# Patient Record
Sex: FEMALE | Race: WHITE | NOT HISPANIC OR LATINO | Employment: FULL TIME | URBAN - METROPOLITAN AREA
[De-identification: names, ages, dates, MRNs, and addresses within clinical notes are randomized per-mention and may not be internally consistent; named-entity substitution may affect disease eponyms.]

---

## 2017-04-05 ENCOUNTER — OFFICE VISIT (OUTPATIENT)
Dept: OBSTETRICS AND GYNECOLOGY | Facility: CLINIC | Age: 22
End: 2017-04-05

## 2017-04-05 VITALS
SYSTOLIC BLOOD PRESSURE: 129 MMHG | BODY MASS INDEX: 22.66 KG/M2 | HEIGHT: 69 IN | DIASTOLIC BLOOD PRESSURE: 88 MMHG | WEIGHT: 153 LBS | HEART RATE: 80 BPM

## 2017-04-05 DIAGNOSIS — N89.8 VAGINAL ODOR: Primary | ICD-10-CM

## 2017-04-05 DIAGNOSIS — N94.89 OTHER SPECIFIED CONDITIONS ASSOCIATED WITH FEMALE GENITAL ORGANS AND MENSTRUAL CYCLE: ICD-10-CM

## 2017-04-05 PROCEDURE — 99213 OFFICE O/P EST LOW 20 MIN: CPT | Performed by: OBSTETRICS & GYNECOLOGY

## 2017-04-05 NOTE — PROGRESS NOTES
Subjective   Porsha Lopez is a 21 y.o. female.     Cc:  Vaginal odor    History of Present Illness - Patient is a 21 year old female who presents with intermittent vaginal odor over the past month.  Longest symptoms last continuously is 3 days before recurring.  Patient has not had symptoms prior to this.  She has not had a work up.  Patient tried anti-fungal at home with no remittance of her symptoms.  She denies exposure to STD.    The following portions of the patient's history were reviewed and updated as appropriate:   She  has no past medical history on file.  She  reports that she has never smoked. She does not have any smokeless tobacco history on file. She reports that she drinks alcohol. She reports that she does not use illicit drugs.  No current outpatient prescriptions on file.     Current Facility-Administered Medications   Medication Dose Route Frequency Provider Last Rate Last Dose   • levonorgestrel (MIRENA) 20 MCG/24HR IUD   Intrauterine Once Osvaldo Vora MD         She has No Known Allergies..    Review of Systems   Constitutional: Negative for chills and fever.   Genitourinary: Negative for menstrual problem and vaginal discharge.       Objective   Physical Exam   Constitutional: She appears well-developed and well-nourished.   Genitourinary: Pelvic exam was performed with patient supine. There is no tenderness or lesion on the right labia. There is no tenderness or lesion on the left labia. Cervix exhibits no motion tenderness and no friability. No foreign body in the vagina. Vaginal discharge found.   Genitourinary Comments: IUD strings noted and in place   Psychiatric: She has a normal mood and affect. Judgment and thought content normal.   Vitals reviewed.      Assessment/Plan   Porsha was seen today for gynecologic exam.    Diagnoses and all orders for this visit:    Vaginal odor  -     NuSwab Vaginitis (VG)    Other specified conditions associated with female genital  organs and menstrual cycle   -     NuSwab Vaginitis (VG)

## 2017-04-09 LAB
A VAGINAE DNA VAG QL NAA+PROBE: NORMAL SCORE
BVAB2 DNA VAG QL NAA+PROBE: NORMAL SCORE
C ALBICANS DNA VAG QL NAA+PROBE: NEGATIVE
C GLABRATA DNA VAG QL NAA+PROBE: NEGATIVE
MEGA1 DNA VAG QL NAA+PROBE: NORMAL SCORE
T VAGINALIS RRNA SPEC QL NAA+PROBE: NEGATIVE

## 2018-07-02 ENCOUNTER — OFFICE VISIT (OUTPATIENT)
Dept: OBSTETRICS AND GYNECOLOGY | Facility: CLINIC | Age: 23
End: 2018-07-02

## 2018-07-02 VITALS
DIASTOLIC BLOOD PRESSURE: 72 MMHG | BODY MASS INDEX: 19.99 KG/M2 | HEART RATE: 78 BPM | HEIGHT: 69 IN | SYSTOLIC BLOOD PRESSURE: 106 MMHG | WEIGHT: 135 LBS

## 2018-07-02 DIAGNOSIS — Z11.3 SCREENING EXAMINATION FOR STD (SEXUALLY TRANSMITTED DISEASE): ICD-10-CM

## 2018-07-02 DIAGNOSIS — Z01.419 VISIT FOR GYNECOLOGIC EXAMINATION: Primary | ICD-10-CM

## 2018-07-02 PROCEDURE — 99395 PREV VISIT EST AGE 18-39: CPT | Performed by: OBSTETRICS & GYNECOLOGY

## 2018-07-02 NOTE — PROGRESS NOTES
"[facility]  [address], [address]  [place], [place] [address]  Phone: [phone] / Fax:  [phone]      2018    [address] [facility] [address]    Candy Marie    Chief Complaint   Patient presents with   • Gynecologic Exam     Annual Exam, last pap 10-17-16 nl. Pt has Mirena IUD in place 10/2016.        Porsha Lopez is here for annual gynecologic exam.  HPI - Patient has Mirena for contraception.  No major issues.  Wants STD testing.    History reviewed. No pertinent past medical history.    History reviewed. No pertinent surgical history.    Allergies   Allergen Reactions   • Other Unknown (See Comments)       Social History     Social History   • Marital status: Single     Spouse name: N/A   • Number of children: N/A   • Years of education: N/A     Occupational History   • Not on file.     Social History Main Topics   • Smoking status: Never Smoker   • Smokeless tobacco: Not on file   • Alcohol use Yes      Comment: social   • Drug use: Yes     Types: Marijuana   • Sexual activity: Not Currently     Partners: Male     Birth control/ protection: IUD      Comment: was using Nuva Ring, recently ran out has not used one in a month.     Other Topics Concern   • Not on file     Social History Narrative   • No narrative on file       Family History   Problem Relation Age of Onset   • Hypertension Father    • No Known Problems Mother        No LMP recorded. Patient is not currently having periods (Reason: Other).    OB History      Para Term  AB Living    0 0 0 0 0 0    SAB TAB Ectopic Molar Multiple Live Births    0 0 0   0            Vitals:    18 1429   BP: 106/72   Pulse: 78   Weight: 61.2 kg (135 lb)   Height: 175.3 cm (69\")       Physical Exam   Constitutional: She appears well-developed and well-nourished.   Genitourinary: Vagina normal and uterus normal. Pelvic exam was performed with patient supine. There is no tenderness or lesion on the right " labia. There is no tenderness or lesion on the left labia. Right adnexum does not display tenderness and does not display fullness. Left adnexum does not display tenderness and does not display fullness.   Cervix exhibits visible IUD strings. Cervix does not exhibit motion tenderness or lesion.   HENT:   Right Ear: External ear normal.   Left Ear: External ear normal.   Nose: Nose normal.   Eyes: Conjunctivae are normal.   Neck: Normal range of motion. Neck supple. No thyromegaly present.   Cardiovascular: Normal rate, regular rhythm and normal heart sounds.    Pulmonary/Chest: Effort normal. She has no wheezes. She has no rales. Right breast exhibits no mass and no nipple discharge. Left breast exhibits no mass and no nipple discharge.   Abdominal: Soft. There is no tenderness. There is no guarding.   Musculoskeletal: Normal range of motion. She exhibits no edema.   Neurological: She is alert. Coordination normal.   Skin: Skin is warm and dry.   Psychiatric: She has a normal mood and affect. Her behavior is normal. Judgment and thought content normal.   Vitals reviewed.      Porsha was seen today for gynecologic exam.    Diagnoses and all orders for this visit:    Visit for gynecologic examination  -  Discussed importance of regular screening and breast awareness.      Osvaldo Vora MD

## 2018-07-05 LAB
C TRACH RRNA SPEC QL NAA+PROBE: POSITIVE
N GONORRHOEA RRNA SPEC QL NAA+PROBE: NEGATIVE
T VAGINALIS RRNA SPEC QL NAA+PROBE: NEGATIVE

## 2018-07-06 ENCOUNTER — TELEPHONE (OUTPATIENT)
Dept: OBSTETRICS AND GYNECOLOGY | Facility: CLINIC | Age: 23
End: 2018-07-06

## 2018-07-06 RX ORDER — AZITHROMYCIN 500 MG/1
1000 TABLET, FILM COATED ORAL ONCE
Qty: 2 TABLET | Refills: 0 | Status: SHIPPED | OUTPATIENT
Start: 2018-07-06 | End: 2018-07-06

## 2018-07-06 NOTE — TELEPHONE ENCOUNTER
Patient aware of Chlamydia.  Discussed treatment and partner notification.  Patient to follow up in 3 to 6 weeks for AIDEN.

## 2020-01-06 ENCOUNTER — OFFICE VISIT (OUTPATIENT)
Dept: OBSTETRICS AND GYNECOLOGY | Facility: CLINIC | Age: 25
End: 2020-01-06

## 2020-01-06 VITALS
SYSTOLIC BLOOD PRESSURE: 118 MMHG | WEIGHT: 141 LBS | HEIGHT: 69 IN | BODY MASS INDEX: 20.88 KG/M2 | DIASTOLIC BLOOD PRESSURE: 74 MMHG

## 2020-01-06 DIAGNOSIS — Z01.419 VISIT FOR GYNECOLOGIC EXAMINATION: Primary | ICD-10-CM

## 2020-01-06 PROCEDURE — 99395 PREV VISIT EST AGE 18-39: CPT | Performed by: OBSTETRICS & GYNECOLOGY

## 2020-01-06 NOTE — PROGRESS NOTES
"[facility]  [address], [address]  [place], [place] [address]  Phone: [phone] / Fax:  [phone]      2020    [address] [facility] [address]    Candy Marie (Inactive)    Chief Complaint   Patient presents with   • Gynecologic Exam     Annual Exam,last pap 10-17-16 nl..       Porsha Lopez is here for annual gynecologic exam.  HPI - Patient has IUD.  She has scant bleeding.  She requests STD swab but denies any known exposure.  She has had an STD in past.    History reviewed. No pertinent past medical history.    History reviewed. No pertinent surgical history.    Allergies   Allergen Reactions   • Other Unknown (See Comments)       Social History     Socioeconomic History   • Marital status: Single     Spouse name: Not on file   • Number of children: Not on file   • Years of education: Not on file   • Highest education level: Not on file   Tobacco Use   • Smoking status: Never Smoker   Substance and Sexual Activity   • Alcohol use: Yes     Comment: social   • Drug use: Yes     Types: Marijuana   • Sexual activity: Not Currently     Partners: Male     Birth control/protection: IUD     Comment: was using Nuva Ring, recently ran out has not used one in a month.       Family History   Problem Relation Age of Onset   • Hypertension Father    • No Known Problems Mother        No LMP recorded. (Menstrual status: Other).    OB History        0    Para   0    Term   0       0    AB   0    Living   0       SAB   0    TAB   0    Ectopic   0    Molar        Multiple   0    Live Births                    Vitals:    20 1538   BP: 118/74   Weight: 64 kg (141 lb)   Height: 175.3 cm (69\")       Physical Exam   Constitutional: She appears well-developed and well-nourished.   Genitourinary: Vagina normal and uterus normal. Pelvic exam was performed with patient supine. There is no tenderness or lesion on the right labia. There is no tenderness or lesion on the left " labia. Right adnexum does not display tenderness and does not display fullness. Left adnexum does not display tenderness and does not display fullness.   Cervix exhibits visible IUD strings. Cervix does not exhibit motion tenderness.   HENT:   Right Ear: External ear normal.   Left Ear: External ear normal.   Nose: Nose normal.   Eyes: Conjunctivae are normal.   Neck: Normal range of motion. Neck supple. No thyromegaly present.   Cardiovascular: Normal rate, regular rhythm and normal heart sounds.   Pulmonary/Chest: Effort normal. No stridor. She has no wheezes. Right breast exhibits no mass and no nipple discharge. Left breast exhibits no mass and no nipple discharge.   Abdominal: Soft. There is no tenderness. There is no guarding.   Musculoskeletal: Normal range of motion. She exhibits no edema.   Neurological: She is alert. Coordination normal.   Skin: Skin is warm and dry.   Psychiatric: She has a normal mood and affect. Her behavior is normal. Judgment and thought content normal.   Vitals reviewed.      Porsha was seen today for gynecologic exam.    Diagnoses and all orders for this visit:    Visit for gynecologic examination  -     IGP, Rfx Aptima HPV ASCU  -     STD swab done.  -     Discussed importance of regular screening and breast awareness.        Osvaldo Vora MD

## 2020-01-09 LAB
C TRACH RRNA SPEC QL NAA+PROBE: NEGATIVE
N GONORRHOEA RRNA SPEC QL NAA+PROBE: NEGATIVE
T VAGINALIS DNA SPEC QL NAA+PROBE: NEGATIVE

## 2020-01-14 LAB
CONV .: ABNORMAL
CYTOLOGIST CVX/VAG CYTO: ABNORMAL
CYTOLOGY CVX/VAG DOC CYTO: ABNORMAL
CYTOLOGY CVX/VAG DOC THIN PREP: ABNORMAL
DX ICD CODE: ABNORMAL
DX ICD CODE: ABNORMAL
HIV 1 & 2 AB SER-IMP: ABNORMAL
HPV I/H RISK 4 DNA CVX QL PROBE+SIG AMP: POSITIVE
Lab: ABNORMAL
OTHER STN SPEC: ABNORMAL
PATHOLOGIST CVX/VAG CYTO: ABNORMAL
RECOM F/U CVX/VAG CYTO: ABNORMAL
STAT OF ADQ CVX/VAG CYTO-IMP: ABNORMAL

## 2020-01-15 ENCOUNTER — TELEPHONE (OUTPATIENT)
Dept: OBSTETRICS AND GYNECOLOGY | Facility: CLINIC | Age: 25
End: 2020-01-15

## 2020-01-15 NOTE — TELEPHONE ENCOUNTER
Patient aware of results and is scheduled for Colpo. 1-/lw  Left message for patient to call back and mailed a letter, to contact the office. 1-/lw  Left message for patient to call back. 1-/lw  Left message for patient to call back. 1-/lw  ----- Message from Osvaldo Vora MD sent at 1/14/2020  3:28 PM EST -----  LAW - Her pap was read as ASCUS HPV positive.  She is at increased risk for pre-cancer of cervix.  Please arrange for colposcopy.

## 2020-04-23 ENCOUNTER — TELEPHONE (OUTPATIENT)
Dept: OBSTETRICS AND GYNECOLOGY | Facility: CLINIC | Age: 25
End: 2020-04-23

## 2020-04-23 NOTE — TELEPHONE ENCOUNTER
Patient expresses concern because she has itching in her vaginal area. No buring when she urinates, no odd odor, no discharge. She has only had one sexual partner and is unsure of his STD status. She also previously tested positive for precancerous cells so would also like to see about that.  Should I schedule her to come in?

## 2020-04-27 NOTE — TELEPHONE ENCOUNTER
Patient to come this Thursday the 30th @ 8:45 for a Colpo, she also notes some vaginal irritation. 4-28-20/lw  Left message for patient to call back. 4-/lw  Left message for patient to call back. 4-/lw

## 2020-04-30 ENCOUNTER — PROCEDURE VISIT (OUTPATIENT)
Dept: OBSTETRICS AND GYNECOLOGY | Facility: CLINIC | Age: 25
End: 2020-04-30

## 2020-04-30 VITALS
DIASTOLIC BLOOD PRESSURE: 72 MMHG | BODY MASS INDEX: 22.22 KG/M2 | SYSTOLIC BLOOD PRESSURE: 112 MMHG | HEIGHT: 69 IN | WEIGHT: 150 LBS

## 2020-04-30 DIAGNOSIS — R87.810 ASCUS WITH POSITIVE HIGH RISK HPV CERVICAL: Primary | ICD-10-CM

## 2020-04-30 DIAGNOSIS — N89.8 VAGINAL DISCHARGE: ICD-10-CM

## 2020-04-30 DIAGNOSIS — R87.610 ASCUS WITH POSITIVE HIGH RISK HPV CERVICAL: Primary | ICD-10-CM

## 2020-04-30 LAB
B-HCG UR QL: NEGATIVE
INTERNAL NEGATIVE CONTROL: NEGATIVE
INTERNAL POSITIVE CONTROL: POSITIVE
Lab: NORMAL

## 2020-04-30 PROCEDURE — 57454 BX/CURETT OF CERVIX W/SCOPE: CPT | Performed by: OBSTETRICS & GYNECOLOGY

## 2020-04-30 PROCEDURE — 81025 URINE PREGNANCY TEST: CPT | Performed by: OBSTETRICS & GYNECOLOGY

## 2020-04-30 PROCEDURE — 99212 OFFICE O/P EST SF 10 MIN: CPT | Performed by: OBSTETRICS & GYNECOLOGY

## 2020-04-30 NOTE — PROGRESS NOTES
Subjective   Porsha Lopez is a 25 y.o. female.     Cc:  Abnormal pap and vaginal discharge    History of Present Illness - Patient is a 25 year old female, nulligravid, who had abnormal pap testing.  This is first abnormal pap.  She denies bleeding or spotting.  IUD is used for contraception.  She had at least 2 doses of HPV vaccine in past.  In addition, patient with vaginal discharge and odor.  No known STD exposures.  She reports trying herbal/home remedies for discharge.    The following portions of the patient's history were reviewed and updated as appropriate:   She  has a past medical history of Abnormal Pap smear of cervix and HPV (human papilloma virus) infection.  She  reports that she has never smoked. She does not have any smokeless tobacco history on file. She reports that she drinks alcohol. She reports that she has current or past drug history. Drug: Marijuana.  No current outpatient medications on file.     Current Facility-Administered Medications   Medication Dose Route Frequency Provider Last Rate Last Dose   • levonorgestrel (MIRENA) 20 MCG/24HR IUD   Intrauterine Once Osvaldo Vora MD         She is allergic to other..    Review of Systems   Constitutional: Negative for chills and fever.   Genitourinary: Positive for vaginal discharge. Negative for vaginal bleeding.       Objective   Physical Exam   Constitutional: She appears well-developed and well-nourished.   Genitourinary: Pelvic exam was performed with patient supine. There is no tenderness or lesion on the right labia. There is no tenderness or lesion on the left labia. Cervix exhibits no motion tenderness, no discharge and no friability. No foreign body in the vagina. Vaginal discharge found.   Psychiatric: She has a normal mood and affect. Her behavior is normal. Judgment and thought content normal.   Vitals reviewed.  Colposcopy Procedure Note    Indications: Pap smear 3 months ago showed: ASCUS with POSITIVE high risk  HPV. The prior pap showed no abnormalities.  Prior cervical/vaginal disease: none. Prior cervical treatment: no treatment.    Procedure Details   The risks and benefits of the procedure and Verbal informed consent obtained.    Speculum placed in vagina and excellent visualization of cervix achieved, cervix swabbed x 3 with acetic acid solution.    Findings:  Cervix: acetowhite lesion(s) noted at 10 o'clock; endocervical curettage performed, cervical biopsies taken at 11 o'clock, specimen labelled and sent to pathology and hemostasis achieved with Monsel's solution.  Vaginal inspection: normal without visible lesions.  Vulvar colposcopy: vulvar colposcopy not performed.    Specimens: ectocervical biopsy and endocervical curettage    Complications: none.  Patient tolerated the procedure well without complications.    Plan:  Specimens labelled and sent to Pathology.  Will base further treatment on Pathology findings.    Assessment/Plan   Porsha was seen today for procedure.    Diagnoses and all orders for this visit:    ASCUS with positive high risk HPV cervical  -     Reference Histopathology  -     Colposcopy  -     If testing is normal or associated with LGSIL, follow up pap in one year.    Vaginal discharge  -     NuSwab VG+ - Swab, Vagina  -     Await cultures.         Osvaldo Vora MD

## 2020-05-04 LAB
DX ICD CODE: NORMAL
PATH REPORT.FINAL DX SPEC: NORMAL
PATH REPORT.GROSS SPEC: NORMAL
PATH REPORT.SITE OF ORIGIN SPEC: NORMAL
PATHOLOGIST NAME: NORMAL
PAYMENT PROCEDURE: NORMAL

## 2020-05-08 ENCOUNTER — TELEPHONE (OUTPATIENT)
Dept: OBSTETRICS AND GYNECOLOGY | Facility: CLINIC | Age: 25
End: 2020-05-08

## 2020-05-08 NOTE — TELEPHONE ENCOUNTER
Left message for patient to call back 5-8-2020/lw  ----- Message from Osvaldo Vora MD sent at 5/7/2020  1:01 PM EDT -----  LAW - Let her know that her tests looks stable and consistent with pap.  I would recommend repeat pap in one year.

## 2020-05-10 LAB
A VAGINAE DNA VAG QL NAA+PROBE: ABNORMAL SCORE
BVAB2 DNA VAG QL NAA+PROBE: ABNORMAL SCORE
C ALBICANS DNA VAG QL NAA+PROBE: POSITIVE
C GLABRATA DNA VAG QL NAA+PROBE: NEGATIVE
C TRACH DNA VAG QL NAA+PROBE: NEGATIVE
MEGA1 DNA VAG QL NAA+PROBE: ABNORMAL SCORE
N GONORRHOEA DNA VAG QL NAA+PROBE: NEGATIVE
T VAGINALIS DNA VAG QL NAA+PROBE: NEGATIVE

## 2020-05-11 ENCOUNTER — TELEPHONE (OUTPATIENT)
Dept: OBSTETRICS AND GYNECOLOGY | Facility: CLINIC | Age: 25
End: 2020-05-11

## 2020-05-11 RX ORDER — FLUCONAZOLE 150 MG/1
150 TABLET ORAL ONCE
Qty: 1 TABLET | Refills: 0 | Status: SHIPPED | OUTPATIENT
Start: 2020-05-11 | End: 2020-05-11

## 2020-05-11 NOTE — TELEPHONE ENCOUNTER
Florence    Let her know that cultures have been taking a long time to get results, probably because of Covid-era issues.    Her last culture showed she had yeast.     I called in Diflucan.    Thanks    Diony

## 2020-10-29 ENCOUNTER — OFFICE VISIT (OUTPATIENT)
Dept: OBSTETRICS AND GYNECOLOGY | Facility: CLINIC | Age: 25
End: 2020-10-29

## 2020-10-29 VITALS
SYSTOLIC BLOOD PRESSURE: 112 MMHG | DIASTOLIC BLOOD PRESSURE: 72 MMHG | HEIGHT: 69 IN | WEIGHT: 146 LBS | BODY MASS INDEX: 21.62 KG/M2

## 2020-10-29 DIAGNOSIS — Z11.3 SCREENING EXAMINATION FOR STD (SEXUALLY TRANSMITTED DISEASE): ICD-10-CM

## 2020-10-29 DIAGNOSIS — N89.8 VAGINAL DISCHARGE: ICD-10-CM

## 2020-10-29 DIAGNOSIS — Z11.4 ENCOUNTER FOR SCREENING FOR HIV: Primary | ICD-10-CM

## 2020-10-29 PROCEDURE — 99213 OFFICE O/P EST LOW 20 MIN: CPT | Performed by: OBSTETRICS & GYNECOLOGY

## 2020-10-29 NOTE — PROGRESS NOTES
Subjective   Porsha Lopez is a 25 y.o. female.     Cc:  Vaginal discharge, STD screen    History of Present Illness - 25 year old female nulligravid with new onset vaginal discharge and irritation.  Patient has new sexual partner and requests STD screening.  Patient tried OTC antifungal with minimal relief in symptoms.  She has an IUD in place.  She has some spotting with intercourse.    The following portions of the patient's history were reviewed and updated as appropriate:   She  has a past medical history of Abnormal Pap smear of cervix, Chlamydia, and HPV (human papilloma virus) infection.  She  reports that she has never smoked. She does not have any smokeless tobacco history on file. She reports current alcohol use. She reports current drug use. Drug: Marijuana.  No current outpatient medications on file.     Current Facility-Administered Medications   Medication Dose Route Frequency Provider Last Rate Last Dose   • levonorgestrel (MIRENA) 20 MCG/24HR IUD   Intrauterine Once Osvaldo oVra MD         She is allergic to other..    Review of Systems   Constitutional: Negative for chills and fever.   Genitourinary: Positive for vaginal discharge. Negative for pelvic pain.       Objective   Physical Exam  Vitals signs and nursing note reviewed. Exam conducted with a chaperone present.   Constitutional:       Appearance: Normal appearance.   Genitourinary:     General: Normal vulva.      Labia:         Right: No rash or tenderness.         Left: No rash or tenderness.       Urethra: No urethral pain or urethral swelling.      Vagina: Vaginal discharge present.      Cervix: Discharge present. No cervical bleeding.      Comments: IUD strings note  Skin:     General: Skin is warm and dry.   Neurological:      Mental Status: She is alert.   Psychiatric:         Mood and Affect: Mood normal.         Behavior: Behavior normal.         Thought Content: Thought content normal.         Judgment: Judgment  normal.         Assessment/Plan   Diagnoses and all orders for this visit:    1. Encounter for screening for HIV (Primary)  -     HIV-1 / O / 2 Ag / Antibody 4th Generation    2. Screening examination for STD (sexually transmitted disease)  -     RPR    3. Vaginal discharge  -     NuSwab VG+ - Swab, Vagina  -     Swab done and await cultures.  Treat based on results.  STD screening recommended and performed.      Osvaldo Vora MD

## 2020-10-30 LAB
HIV 1+2 AB+HIV1 P24 AG SERPL QL IA: NON REACTIVE
RPR SER QL: NON REACTIVE

## 2020-11-01 LAB
A VAGINAE DNA VAG QL NAA+PROBE: NORMAL SCORE
BVAB2 DNA VAG QL NAA+PROBE: NORMAL SCORE
C ALBICANS DNA VAG QL NAA+PROBE: NEGATIVE
C GLABRATA DNA VAG QL NAA+PROBE: NEGATIVE
C TRACH DNA VAG QL NAA+PROBE: NEGATIVE
MEGA1 DNA VAG QL NAA+PROBE: NORMAL SCORE
N GONORRHOEA DNA VAG QL NAA+PROBE: NEGATIVE
T VAGINALIS DNA VAG QL NAA+PROBE: NEGATIVE

## 2020-11-02 ENCOUNTER — TELEPHONE (OUTPATIENT)
Dept: OBSTETRICS AND GYNECOLOGY | Facility: CLINIC | Age: 25
End: 2020-11-02

## 2021-01-13 ENCOUNTER — OFFICE VISIT (OUTPATIENT)
Dept: OBSTETRICS AND GYNECOLOGY | Facility: CLINIC | Age: 26
End: 2021-01-13

## 2021-01-13 VITALS
HEIGHT: 69 IN | WEIGHT: 146 LBS | DIASTOLIC BLOOD PRESSURE: 70 MMHG | SYSTOLIC BLOOD PRESSURE: 118 MMHG | BODY MASS INDEX: 21.62 KG/M2

## 2021-01-13 DIAGNOSIS — N87.0 MILD DYSPLASIA OF CERVIX (CIN I): ICD-10-CM

## 2021-01-13 DIAGNOSIS — Z01.419 VISIT FOR GYNECOLOGIC EXAMINATION: Primary | ICD-10-CM

## 2021-01-13 PROCEDURE — 99395 PREV VISIT EST AGE 18-39: CPT | Performed by: OBSTETRICS & GYNECOLOGY

## 2021-01-13 NOTE — PROGRESS NOTES
"[facility]  [address], [address]  [place], [place] [address]  Phone: [phone] / Fax:  [phone]      2021    [address] [facility] [address]    Candy Marie (Inactive)    Chief Complaint   Patient presents with   • Gynecologic Exam     Annual Exam, last pap 1-6-20ASCUS HPV (+), Colpo 4-30-20 LOIS 1-ECC.       Porsha Lopez is here for annual gynecologic exam.  HPI - Patient has IUD in place and has scant periods.  Her IUD expires in 2021.  She declines STD testing.  Patient with LOIS I last year on colposcopy.    Past Medical History:   Diagnosis Date   • Abnormal Pap smear of cervix    • Chlamydia    • HPV (human papilloma virus) infection        History reviewed. No pertinent surgical history.    Allergies   Allergen Reactions   • Other Unknown (See Comments)       Social History     Socioeconomic History   • Marital status: Single     Spouse name: Not on file   • Number of children: Not on file   • Years of education: Not on file   • Highest education level: Not on file   Tobacco Use   • Smoking status: Never Smoker   Substance and Sexual Activity   • Alcohol use: Yes     Comment: social   • Drug use: Not Currently     Types: Marijuana   • Sexual activity: Yes     Partners: Male     Birth control/protection: I.U.D.     Comment: was using Nuva Ring, recently ran out has not used one in a month.       Family History   Problem Relation Age of Onset   • Hypertension Father    • No Known Problems Mother    • Breast cancer Maternal Grandmother    • Skin cancer Maternal Grandmother    • Dementia Maternal Grandmother        Patient's last menstrual period was 2020.    OB History        0    Para   0    Term   0       0    AB   0    Living   0       SAB   0    TAB   0    Ectopic   0    Molar        Multiple   0    Live Births                    Vitals:    21 1425   BP: 118/70   Weight: 66.2 kg (146 lb)   Height: 175.3 cm (69.02\")       Physical " Exam  Constitutional:       Appearance: Normal appearance. She is well-developed.   Genitourinary:      Pelvic exam was performed with patient in the lithotomy position.      Urethra, bladder, vagina and uterus normal.      No cervical motion tenderness or lesion.      IUD strings visualized.      Right adnexa not tender or full.      Left adnexa not tender or full.   HENT:      Right Ear: External ear normal.      Left Ear: External ear normal.      Nose: Nose normal.   Eyes:      Conjunctiva/sclera: Conjunctivae normal.   Neck:      Musculoskeletal: Normal range of motion and neck supple.      Thyroid: No thyromegaly.   Cardiovascular:      Rate and Rhythm: Normal rate and regular rhythm.      Heart sounds: Normal heart sounds.   Pulmonary:      Effort: Pulmonary effort is normal.      Breath sounds: No stridor. No wheezing.   Chest:      Breasts:         Right: No mass or nipple discharge.         Left: No mass or nipple discharge.   Abdominal:      Palpations: Abdomen is soft. There is no mass.      Tenderness: There is no guarding or rebound.   Musculoskeletal: Normal range of motion.   Neurological:      Mental Status: She is alert.      Coordination: Coordination normal.   Skin:     General: Skin is warm and dry.   Psychiatric:         Mood and Affect: Mood normal.         Behavior: Behavior normal.         Thought Content: Thought content normal.         Judgment: Judgment normal.   Vitals signs reviewed.         Diagnoses and all orders for this visit:    1. Visit for gynecologic examination (Primary)        -     Discussed importance of regular screening and breast awareness.          -     IUD to be replaced later in year.  2. Mild dysplasia of cervix (LOIS I)  -     IGP, Rfx Aptima HPV ASCU  -     Await results.        Osvadlo Vora MD

## 2021-01-19 LAB
CONV .: ABNORMAL
CYTOLOGIST CVX/VAG CYTO: ABNORMAL
CYTOLOGY CVX/VAG DOC CYTO: ABNORMAL
CYTOLOGY CVX/VAG DOC THIN PREP: ABNORMAL
DX ICD CODE: ABNORMAL
DX ICD CODE: ABNORMAL
HIV 1 & 2 AB SER-IMP: ABNORMAL
OTHER STN SPEC: ABNORMAL
PATHOLOGIST CVX/VAG CYTO: ABNORMAL
STAT OF ADQ CVX/VAG CYTO-IMP: ABNORMAL

## 2021-01-20 ENCOUNTER — TELEPHONE (OUTPATIENT)
Dept: OBSTETRICS AND GYNECOLOGY | Facility: CLINIC | Age: 26
End: 2021-01-20

## 2021-01-20 NOTE — TELEPHONE ENCOUNTER
Patient called back, she's aware she needs to schedule a colpo, but she has questions and would like a call back, if possible.    Thank you!

## 2021-01-20 NOTE — TELEPHONE ENCOUNTER
Left message for patient to call back. 1-20-21/lw  ----- Message from Osvaldo Vora MD sent at 1/19/2021  5:02 PM EST -----  LAW - Let her know that her pap was abnormal again.  It was LGSIL and I recommend another evaluation with colposcopy.

## 2021-01-20 NOTE — TELEPHONE ENCOUNTER
Spoke to patient she is aware of results and need for Colpo. She was schedule for Feb 1st. 1-20-21/lw

## 2021-02-01 ENCOUNTER — PROCEDURE VISIT (OUTPATIENT)
Dept: OBSTETRICS AND GYNECOLOGY | Facility: CLINIC | Age: 26
End: 2021-02-01

## 2021-02-01 VITALS
BODY MASS INDEX: 21.12 KG/M2 | HEIGHT: 69 IN | WEIGHT: 142.6 LBS | SYSTOLIC BLOOD PRESSURE: 100 MMHG | DIASTOLIC BLOOD PRESSURE: 60 MMHG

## 2021-02-01 DIAGNOSIS — R87.612 LGSIL ON PAP SMEAR OF CERVIX: Primary | ICD-10-CM

## 2021-02-01 PROCEDURE — 57454 BX/CURETT OF CERVIX W/SCOPE: CPT | Performed by: OBSTETRICS & GYNECOLOGY

## 2021-02-01 NOTE — PROGRESS NOTES
Colposcopy Procedure Note    Indications: Pap smear 1 months ago showed: low-grade squamous intraepithelial neoplasia (LGSIL - encompassing HPV,mild dysplasia,LOIS I). The prior pap showed low-grade squamous intraepithelial neoplasia (LGSIL - encompassing HPV,mild dysplasia,LOIS I).  Prior cervical/vaginal disease: LOIS 1. Prior cervical treatment: no treatment.    Procedure Details   The risks and benefits of the procedure and Verbal informed consent obtained.    Speculum placed in vagina and excellent visualization of cervix achieved, cervix swabbed x 3 with acetic acid solution.    Findings:  Cervix: acetowhite lesion(s) noted at 9 o'clock; endocervical curettage performed, cervical biopsies taken at 9 o'clock, specimen labelled and sent to pathology and hemostasis achieved with Monsel's solution.  Vaginal inspection: normal without visible lesions.  Vulvar colposcopy: vulvar colposcopy not performed.    Specimens: ectocervical biopsy and endocervical curettage.    Complications: none.  Patient tolerated the procedure well without complications.    Plan:  Specimens labelled and sent to Pathology.  Will base further treatment on Pathology findings.  Treatment options discussed with patient.    Osvaldo Vora MD

## 2021-02-03 LAB
DX ICD CODE: NORMAL
PATH REPORT.FINAL DX SPEC: NORMAL
PATH REPORT.GROSS SPEC: NORMAL
PATH REPORT.RELEVANT HX SPEC: NORMAL
PATH REPORT.SITE OF ORIGIN SPEC: NORMAL
PATHOLOGIST NAME: NORMAL
PAYMENT PROCEDURE: NORMAL

## 2021-02-04 ENCOUNTER — TELEPHONE (OUTPATIENT)
Dept: OBSTETRICS AND GYNECOLOGY | Facility: CLINIC | Age: 26
End: 2021-02-04

## 2021-02-04 NOTE — TELEPHONE ENCOUNTER
Patient aware of results and the need to follow-up in 1 year. 2-4-21/lw  ----- Message from Osvaldo Vora MD sent at 2/3/2021  9:27 PM EST -----  LAW - Let her know that her colposcopy testing was negative.  She should do another pap in one year.